# Patient Record
Sex: FEMALE | URBAN - METROPOLITAN AREA
[De-identification: names, ages, dates, MRNs, and addresses within clinical notes are randomized per-mention and may not be internally consistent; named-entity substitution may affect disease eponyms.]

---

## 2020-10-09 ENCOUNTER — APPOINTMENT (OUTPATIENT)
Age: 85
Setting detail: DERMATOLOGY
End: 2020-10-18

## 2020-10-09 VITALS — RESPIRATION RATE: 18 BRPM | WEIGHT: 127 LBS | TEMPERATURE: 98.6 F | HEIGHT: 66 IN

## 2020-10-09 DIAGNOSIS — D49.2 NEOPLASM OF UNSPECIFIED BEHAVIOR OF BONE, SOFT TISSUE, AND SKIN: ICD-10-CM

## 2020-10-09 PROCEDURE — 99202 OFFICE O/P NEW SF 15 MIN: CPT

## 2020-10-09 PROCEDURE — OTHER TREATMENT REGIMEN: OTHER

## 2020-10-09 PROCEDURE — OTHER COUNSELING: OTHER

## 2020-10-09 PROCEDURE — OTHER MIPS QUALITY: OTHER

## 2020-10-09 PROCEDURE — OTHER PHOTO-DOCUMENTATION: OTHER

## 2020-10-09 ASSESSMENT — LOCATION DETAILED DESCRIPTION DERM: LOCATION DETAILED: RIGHT ULNAR DORSAL HAND

## 2020-10-09 ASSESSMENT — LOCATION ZONE DERM: LOCATION ZONE: HAND

## 2020-10-09 ASSESSMENT — LOCATION SIMPLE DESCRIPTION DERM: LOCATION SIMPLE: RIGHT HAND

## 2020-10-09 NOTE — PROCEDURE: TREATMENT REGIMEN
Plan: Biopsy not performed at today’s visit due to current stage of Dementia and inability to properly care for the biopsy wound. Recommended that the lesion be excised with general surgery all in one visit to decrease chances of infection with biopsy and excision at separate times. Offered to refer patient to Dr. Mazariegos with general surgery but patient prefers to be seen at [facility]. Patient’s daughter states that she will schedule the patient per our recommendation with general surgery at [facility] and would like to bypass a biopsy at this time. Patient does not need a referral.\\nPatient will return to clinic as needed Plan: Biopsy not performed at today’s visit due to current stage of Dementia and inability to properly care for the biopsy wound. Recommended that the lesion be excised with general surgery all in one visit to decrease chances of infection with biopsy and excision at separate times. Offered to refer patient to Dr. Mazariegos with general surgery but patient prefers to be seen at [facility]. Patient’s daughter states that she will schedule the patient per our recommendation with general surgery at [facility] and would like to bypass a biopsy at this time. Patient does not need a referral.\\nPatient will return to clinic as needed